# Patient Record
Sex: FEMALE | Race: WHITE | NOT HISPANIC OR LATINO | Employment: PART TIME | URBAN - METROPOLITAN AREA
[De-identification: names, ages, dates, MRNs, and addresses within clinical notes are randomized per-mention and may not be internally consistent; named-entity substitution may affect disease eponyms.]

---

## 2021-03-23 ENCOUNTER — OFFICE VISIT (OUTPATIENT)
Dept: PODIATRY | Facility: CLINIC | Age: 61
End: 2021-03-23

## 2021-03-23 VITALS
WEIGHT: 223 LBS | DIASTOLIC BLOOD PRESSURE: 66 MMHG | HEIGHT: 65 IN | SYSTOLIC BLOOD PRESSURE: 112 MMHG | BODY MASS INDEX: 37.15 KG/M2 | HEART RATE: 74 BPM

## 2021-03-23 DIAGNOSIS — M25.571 CHRONIC PAIN OF RIGHT ANKLE: Primary | ICD-10-CM

## 2021-03-23 DIAGNOSIS — M19.071 ARTHRITIS OF ANKLE, RIGHT: ICD-10-CM

## 2021-03-23 DIAGNOSIS — G89.29 CHRONIC PAIN OF RIGHT ANKLE: Primary | ICD-10-CM

## 2021-03-23 DIAGNOSIS — M25.371 ANKLE INSTABILITY, RIGHT: ICD-10-CM

## 2021-03-23 PROCEDURE — 20606 DRAIN/INJ JOINT/BURSA W/US: CPT | Performed by: PODIATRIST

## 2021-03-23 PROCEDURE — 99203 OFFICE O/P NEW LOW 30 MIN: CPT | Performed by: PODIATRIST

## 2021-03-23 RX ORDER — TRIAMTERENE AND HYDROCHLOROTHIAZIDE 75; 50 MG/1; MG/1
0.5 TABLET ORAL DAILY
COMMUNITY
Start: 2021-03-01 | End: 2022-03-01

## 2021-03-23 RX ORDER — SCOLOPAMINE TRANSDERMAL SYSTEM 1 MG/1
1.5 PATCH, EXTENDED RELEASE TRANSDERMAL
COMMUNITY
Start: 2021-03-01 | End: 2022-09-08

## 2021-03-23 RX ORDER — LEVOTHYROXINE SODIUM 0.07 MG/1
75 TABLET ORAL
COMMUNITY
Start: 2021-01-28

## 2021-03-23 RX ORDER — DICLOFENAC SODIUM 25 MG/1
TABLET, DELAYED RELEASE ORAL
COMMUNITY
Start: 2021-02-15 | End: 2021-06-10

## 2021-03-23 RX ORDER — DICLOFENAC SODIUM 75 MG/1
75 TABLET, DELAYED RELEASE ORAL
COMMUNITY
Start: 2021-03-01 | End: 2021-06-10

## 2021-03-23 RX ORDER — LIOTHYRONINE SODIUM 5 UG/1
10 TABLET ORAL DAILY
COMMUNITY
Start: 2021-01-28

## 2021-03-23 RX ORDER — MOMETASONE FUROATE 50 UG/1
2 SPRAY, METERED NASAL DAILY
COMMUNITY
Start: 2021-03-01 | End: 2021-06-10

## 2021-03-23 RX ORDER — ROSUVASTATIN CALCIUM 10 MG/1
10 TABLET, COATED ORAL DAILY
COMMUNITY

## 2021-03-23 RX ORDER — TRIAMCINOLONE ACETONIDE 40 MG/ML
40 INJECTION, SUSPENSION INTRA-ARTICULAR; INTRAMUSCULAR ONCE
Status: COMPLETED | OUTPATIENT
Start: 2021-03-23 | End: 2021-03-23

## 2021-03-23 RX ORDER — TRIAMCINOLONE ACETONIDE 55 UG/1
2 SPRAY, METERED NASAL DAILY
COMMUNITY
Start: 2021-03-01 | End: 2022-03-01

## 2021-03-23 RX ORDER — FEXOFENADINE HCL 180 MG/1
180 TABLET ORAL
COMMUNITY
End: 2021-06-10

## 2021-03-23 RX ADMIN — TRIAMCINOLONE ACETONIDE 40 MG: 40 INJECTION, SUSPENSION INTRA-ARTICULAR; INTRAMUSCULAR at 10:55

## 2021-03-24 NOTE — PATIENT INSTRUCTIONS
Ankle Pain  The ankle joint holds your body weight and allows you to move around. Ankle pain can occur on either side or the back of one ankle or both ankles. Ankle pain may be sharp and burning or dull and aching. There may be tenderness, stiffness, redness, or warmth around the ankle. Many things can cause ankle pain, including an injury to the area and overuse of the ankle.  Follow these instructions at home:  Activity  · Rest your ankle as told by your health care provider. Avoid any activities that cause ankle pain.  · Do not use the injured limb to support your body weight until your health care provider says that you can. Use crutches as told by your health care provider.  · Do exercises as told by your health care provider.  · Ask your health care provider when it is safe to drive if you have a brace on your ankle.  If you have a brace:  · Wear the brace as told by your health care provider. Remove it only as told by your health care provider.  · Loosen the brace if your toes tingle, become numb, or turn cold and blue.  · Keep the brace clean.  · If the brace is not waterproof:  ? Do not let it get wet.  ? Cover it with a watertight covering when you take a bath or shower.  If you were given an elastic bandage:    · Remove it when you take a bath or a shower.  · Try not to move your ankle very much, but wiggle your toes from time to time. This helps to prevent swelling.  · Adjust the bandage to make it more comfortable if it feels too tight.  · Loosen the bandage if you have numbness or tingling in your foot or if your foot turns cold and blue.  Managing pain, stiffness, and swelling    · If directed, put ice on the painful area.  ? If you have a removable brace or elastic bandage, remove it as told by your health care provider.  ? Put ice in a plastic bag.  ? Place a towel between your skin and the bag.  ? Leave the ice on for 20 minutes, 2-3 times a day.  · Move your toes often to avoid stiffness and to  lessen swelling.  · Raise (elevate) your ankle above the level of your heart while you are sitting or lying down.  General instructions  · Record information about your pain. Writing down the following may be helpful for you and your health care provider:  ? How often you have ankle pain.  ? Where the pain is located.  ? What the pain feels like.  · If treatment involves wearing a prescribed shoe or insole, make sure you wear it correctly and for as long as told by your health care provider.  · Take over-the-counter and prescription medicines only as told by your health care provider.  · Keep all follow-up visits as told by your health care provider. This is important.  Contact a health care provider if:  · Your pain gets worse.  · Your pain is not relieved with medicines.  · You have a fever or chills.  · You are having more trouble with walking.  · You have new symptoms.  Get help right away if:  · Your foot, leg, toes, or ankle:  ? Tingles or becomes numb.  ? Becomes swollen.  ? Turns pale or blue.  Summary  · Ankle pain can occur on either side or the back of one ankle or both ankles.  · Ankle pain may be sharp and burning or dull and aching.  · Rest your ankle as told by your health care provider. If told, apply ice to the area.  · Take over-the-counter and prescription medicines only as told by your health care provider.  This information is not intended to replace advice given to you by your health care provider. Make sure you discuss any questions you have with your health care provider.  Document Revised: 04/07/2020 Document Reviewed: 06/26/2019  Elsevier Patient Education © 2021 [employer]

## 2021-03-24 NOTE — PROGRESS NOTES
03/23/2021  Foot and Ankle Surgery - New Patient   Provider: Dr. Travis Huffman DPM  Location: [facility]    Subjective:  Tori Payan is a 60 y.o. female.     Chief Complaint   Patient presents with   • Right Ankle - Pain       HPI: Patient is a 60-year-old female that presents with pain involving the right ankle.  She states that these issues have been present for several years.  She has seen numerous providers for this issue in the past.  She states that a majority of the symptoms started after an ankle injury approximately 6 years ago.  She has had intermittent issues involving her ankle ever since.  She does recall the ankle feeling weak and giving out on her.  She has had multiple inversion injuries.  Previous foot and ankle specialist that she has seen states that she has profound arthritis and may require ankle replacement.  Patient has been managing with an AFO but continues to have significant pain and limitation with any substantial activity.  She rates the symptoms a 9 out of 10 at times.  She does state that this significant pain is intermittent and relatively infrequent.      Allergies   Allergen Reactions   • Ciprofloxacin GI Intolerance     nausea   • Codeine Nausea Only   • Sulfamethoxazole-Trimethoprim Nausea And Vomiting   • Celecoxib Rash     Burning in stomach       History reviewed. No pertinent past medical history.    History reviewed. No pertinent surgical history.    History reviewed. No pertinent family history.    Social History     Socioeconomic History   • Marital status:      Spouse name: Not on file   • Number of children: Not on file   • Years of education: Not on file   • Highest education level: Not on file        Current Outpatient Medications on File Prior to Visit   Medication Sig Dispense Refill   • Cholecalciferol 25 MCG (1000 UT) capsule Take 50 mcg by mouth Daily.     • diclofenac (VOLTAREN) 25 MG EC tablet      • diclofenac (VOLTAREN) 75 MG EC  "tablet Take 75 mg by mouth.     • fexofenadine (ALLEGRA) 180 MG tablet Take 180 mg by mouth.     • levothyroxine (SYNTHROID, LEVOTHROID) 75 MCG tablet Take 75 mcg by mouth.     • liothyronine (CYTOMEL) 5 MCG tablet Take 10 mcg by mouth Daily.     • mometasone (NASONEX) 50 MCG/ACT nasal spray 2 sprays into the nostril(s) as directed by provider Daily.     • rosuvastatin (CRESTOR) 10 MG tablet Take 10 mg by mouth Daily.     • Scopolamine (TRANSDERM-SCOP) 1.5 MG/3DAYS patch Place 1.5 mg on the skin as directed by provider Every 3 (Three) Days.     • Triamcinolone Acetonide (NASACORT) 55 MCG/ACT nasal inhaler 2 sprays into the nostril(s) as directed by provider Daily.     • triamterene-hydrochlorothiazide (MAXZIDE) 75-50 MG per tablet Take 0.5 tablets by mouth Daily.       No current facility-administered medications on file prior to visit.       Review of Systems:  General: Denies fever, chills, fatigue, and weakness.  Eyes: Denies vision loss, blurry vision, and excessive redness.  ENT: Denies hearing issues and difficulty swallowing.  Cardiovascular: Denies palpitations, chest pain, or syncopal episodes.  Respiratory: Denies shortness of breath, wheezing, and coughing.  GI: Denies abdominal pain, nausea, and vomiting.   : Denies frequency, hematuria, and urgency.  Musculoskeletal: + Chronic right ankle pain  Derm: Denies rash, open wounds, or suspicious lesions.  Neuro: Denies headaches, numbness, loss of coordination, and tremors.  Psych: Denies anxiety and depression.  Endocrine: Denies temperature intolerance and changes in appetite.  Heme: Denies bleeding disorders or abnormal bruising.     Objective   /66   Pulse 74   Ht 165.1 cm (65\")   Wt 101 kg (223 lb)   BMI 37.11 kg/m²     Foot/Ankle Exam:       General:   Appearance: appears stated age and healthy    Orientation: AAOx3    Affect: appropriate    Gait: antalgic      VASCULAR      Right Foot Vascularity   Normal vascular exam    Dorsalis pedis:  " 2+  Posterior tibial:  2+  Skin Temperature: warm    Edema Grading:  None  CFT:  < 3 seconds  Pedal Hair Growth:  Present  Varicosities: none        NEUROLOGIC     Right Foot Neurologic   Light touch sensation:  Normal  Hot/Cold sensation: normal    Achilles reflex:  2+     MUSCULOSKELETAL      Right Foot Musculoskeletal   Ecchymosis:  None  Tenderness: medial malleolus, lateral malleolus and anterior tibiotalar joint line    Arch:  Normal     MUSCLE STRENGTH     Right Foot Muscle Strength   Normal strength    Foot dorsiflexion:  5  Foot plantar flexion:  5  Foot inversion:  5  Foot eversion:  5     DERMATOLOGIC     Right Foot Dermatologic   Skin: skin intact       TESTS     Right Foot Tests   Anterior drawer: positive    Varus tilt: positive        Right Foot Additional Comments Prominent swelling involving the anterior and lateral aspects of the ankle.  Resting varus angulation.  Rigidity and crepitus noted with ankle range of motion.  Mild guarding.  No prominent discomfort or limitation to the foot joints.  Mild discomfort involving the knee      Assessment/Plan   Diagnoses and all orders for this visit:    1. Chronic pain of right ankle (Primary)  -     XR Ankle 3+ View Right  -     triamcinolone acetonide (KENALOG-40) injection 40 mg    2. Arthritis of ankle, right    3. Ankle instability, right      Patient presents with prominent issues involving the right ankle.  Symptoms are gradually progressive and she is concerned that they will cause further limitation.  She would like to remain active.  Imaging was reviewed showing prominent degenerative changes involving the ankle with the talus cutting into the medial aspect of the tibia.  Resting varus angulation is present with degenerative changes involving the lateral gutter.  I do feel that her symptoms are consistent with degeneration from chronic ankle instability.  We did review the diagnosis and treatment options at length.  She has undergone multiple  "conservative treatments including previous steroid injections and is currently wearing the AFO.  She is very unhappy with the brace that she has.  She has had some improvement with previous steroid injections.  We did discuss operative options including consideration for total ankle replacement versus ankle arthrodesis.  I did review the benefits and risks of both procedures.  She does not feel that she is ready for any type of surgical intervention.  She would like to proceed with observation and conservative care.  Given her recent exacerbation in pain, I do feel that she would benefit from a steroid injection.  We did review the risks and benefits.  Procedure was performed under ultrasound guidance without complication.  I have also dispensed a lace up ankle brace and reviewed proper use and effects.  I would like her to avoid any high-impact and excessive activity.  She is to avoid uneven terrain.  She understands and agrees and is to see me in 3 months for reevaluation.    Ankle Joint Steroid Injection: Right    Informed consent was obtained before proceeding with the procedure. The skin around the anterior lateral portal of the joint was cleansed with alcohol and initially anesthetized with approximately 1.0 mL of 1% lidocaine plain. The area was then prepped with betadine and allowed to dry.  Using aseptic technique, a solution totaling 1.5mL was injected into the ankle joint utilizing a 22g 1.5\" hypodermic needle. The solution contained 0.5cc of 0.5% Marcaine plain, 0.5cc of 1% lidocaine plain, and 0.5cc of Kenalog.  Mild compression was then applied to the injection site and bandage applied.  The patient noted immediate pain relief with active range of motion and neurovascular status remaining intact. The patient tolerated the injection well without complication.      Orders Placed This Encounter   Procedures   • XR Ankle 3+ View Right     Order Specific Question:   Reason for Exam:     Answer:   RM 13 " second opinion past injury 7 years ago     Order Specific Question:   Does this patient have a diabetic monitoring/medication delivering device on?     Answer:   No        Note is dictated utilizing voice recognition software. Unfortunately this leads to occasional typographical errors. I apologize in advance if the situation occurs. If questions occur please do not hesitate to call our office.

## 2021-06-10 ENCOUNTER — OFFICE VISIT (OUTPATIENT)
Dept: PODIATRY | Facility: CLINIC | Age: 61
End: 2021-06-10

## 2021-06-10 VITALS — BODY MASS INDEX: 37.15 KG/M2 | HEIGHT: 65 IN | WEIGHT: 223 LBS

## 2021-06-10 DIAGNOSIS — M19.071 ARTHRITIS OF ANKLE, RIGHT: ICD-10-CM

## 2021-06-10 DIAGNOSIS — M25.371 ANKLE INSTABILITY, RIGHT: ICD-10-CM

## 2021-06-10 DIAGNOSIS — G89.29 CHRONIC PAIN OF RIGHT ANKLE: Primary | ICD-10-CM

## 2021-06-10 DIAGNOSIS — M25.571 CHRONIC PAIN OF RIGHT ANKLE: Primary | ICD-10-CM

## 2021-06-10 PROCEDURE — 99213 OFFICE O/P EST LOW 20 MIN: CPT | Performed by: PODIATRIST

## 2021-06-10 PROCEDURE — 20606 DRAIN/INJ JOINT/BURSA W/US: CPT | Performed by: PODIATRIST

## 2021-06-10 RX ORDER — CELECOXIB 200 MG/1
200 CAPSULE ORAL DAILY
COMMUNITY
End: 2021-10-21 | Stop reason: SDUPTHER

## 2021-06-10 RX ORDER — TRIAMCINOLONE ACETONIDE 40 MG/ML
40 INJECTION, SUSPENSION INTRA-ARTICULAR; INTRAMUSCULAR ONCE
Status: COMPLETED | OUTPATIENT
Start: 2021-06-10 | End: 2021-06-10

## 2021-06-10 RX ADMIN — TRIAMCINOLONE ACETONIDE 40 MG: 40 INJECTION, SUSPENSION INTRA-ARTICULAR; INTRAMUSCULAR at 16:06

## 2021-06-14 NOTE — PROGRESS NOTES
"06/10/2021  Foot and Ankle Surgery - Established Patient/Follow-up  Provider: Dr. Travis Huffman DPM  Location: [facility]    Subjective:  Tori Payan is a 60 y.o. female.     Chief Complaint   Patient presents with   • Right Ankle - Follow-up       HPI: Patient returns for evaluation of her right ankle.  She continues to have chronic pain and limitation.  She did notice substantial improvement after previous steroid injection.  She states that the pain returned approximately 4 weeks ago.  She has gradually increased discomfort causing further limitation.  She has been wearing the lace up ankle brace intermittently without any substantial improvement.  She would like to discuss further treatment options today.    Allergies   Allergen Reactions   • Ciprofloxacin GI Intolerance     nausea   • Codeine Nausea Only   • Sulfamethoxazole-Trimethoprim Nausea And Vomiting   • Celecoxib Rash     Burning in stomach       Current Outpatient Medications on File Prior to Visit   Medication Sig Dispense Refill   • celecoxib (CeleBREX) 200 MG capsule Take 200 mg by mouth Daily.     • Cholecalciferol 25 MCG (1000 UT) capsule Take 50 mcg by mouth Daily.     • levothyroxine (SYNTHROID, LEVOTHROID) 75 MCG tablet Take 75 mcg by mouth.     • liothyronine (CYTOMEL) 5 MCG tablet Take 10 mcg by mouth Daily.     • rosuvastatin (CRESTOR) 10 MG tablet Take 10 mg by mouth Daily.     • Scopolamine (TRANSDERM-SCOP) 1.5 MG/3DAYS patch Place 1.5 mg on the skin as directed by provider Every 3 (Three) Days.     • Triamcinolone Acetonide (NASACORT) 55 MCG/ACT nasal inhaler 2 sprays into the nostril(s) as directed by provider Daily.     • triamterene-hydrochlorothiazide (MAXZIDE) 75-50 MG per tablet Take 0.5 tablets by mouth Daily.       No current facility-administered medications on file prior to visit.       Objective   Ht 165.1 cm (65\")   Wt 101 kg (223 lb)   BMI 37.11 kg/m²     General:   Appearance: appears stated age and " healthy    Orientation: AAOx3    Affect: appropriate    Gait: antalgic       VASCULAR       Right Foot Vascularity   Normal vascular exam    Dorsalis pedis:  2+  Posterior tibial:  2+  Skin Temperature: warm    Edema Grading:  None  CFT:  < 3 seconds  Pedal Hair Growth:  Present  Varicosities: none        NEUROLOGIC      Right Foot Neurologic   Light touch sensation:  Normal  Hot/Cold sensation: normal    Achilles reflex:  2+      MUSCULOSKELETAL       Right Foot Musculoskeletal   Ecchymosis:  None  Tenderness: medial malleolus, lateral malleolus and anterior tibiotalar joint line    Arch:  Normal      MUSCLE STRENGTH      Right Foot Muscle Strength   Normal strength    Foot dorsiflexion:  5  Foot plantar flexion:  5  Foot inversion:  5  Foot eversion:  5      DERMATOLOGIC      Right Foot Dermatologic   Skin: skin intact        TESTS      Right Foot Tests   Anterior drawer: positive    Varus tilt: positive        Right Foot Additional Comments: Continued pain with palpation involving the perimalleoli region.  Prominent swelling involving the anterior lateral aspect of the ankle.    Assessment/Plan   Diagnoses and all orders for this visit:    1. Chronic pain of right ankle (Primary)    2. Arthritis of ankle, right  -     triamcinolone acetonide (KENALOG-40) injection 40 mg    3. Ankle instability, right      Patient returns with continued pain and limitation involving the right ankle.  She has active swelling involving the anterior lateral aspect of the joint.  Previous imaging was reviewed and discussed in office.  Treatment options remain limited.  She did respond to previous steroid injection.  We did discuss possible repeat injection.  We also discussed and reviewed surgical options including total ankle replacement and ankle arthrodesis.  We did discuss recoveries and risks at length.  Patient does not want to proceed with operative intervention at this time.  She would like to proceed with a steroid injection.  " We reviewed the risks and benefits.  Procedure was performed under ultrasound guidance without complication.  I have asked that she continue to wear the lace up ankle brace with activity.  We did review rice therapy as well as proper use of OTC/prescription anti-inflammatories.  I would like to see her in 3 months for further discussion and possible CT.    Ankle Joint Steroid Injection: Right     Informed consent was obtained before proceeding with the procedure. The skin around the anterior lateral portal of the joint was cleansed with alcohol and initially anesthetized with approximately 1.0 mL of 1% lidocaine plain. The area was then prepped with betadine and allowed to dry.  Using aseptic technique, a solution totaling 1.5mL was injected into the ankle joint utilizing a 22g 1.5\" hypodermic needle under ultrasound guidance. The needle and joint were visualized during the procedure. The solution contained 0.5cc of 0.5% Marcaine plain, 0.5cc of 1% lidocaine plain, and 0.5cc of Kenalog.  Mild compression was then applied to the injection site and bandage applied.  The patient noted immediate pain relief with active range of motion and neurovascular status remaining intact. The patient tolerated the injection well without complication.    No orders of the defined types were placed in this encounter.         Note is dictated utilizing voice recognition software. Unfortunately this leads to occasional typographical errors. I apologize in advance if the situation occurs. If questions occur please do not hesitate to call our office.  "

## 2021-10-21 ENCOUNTER — OFFICE VISIT (OUTPATIENT)
Dept: PODIATRY | Facility: CLINIC | Age: 61
End: 2021-10-21

## 2021-10-21 VITALS
SYSTOLIC BLOOD PRESSURE: 116 MMHG | BODY MASS INDEX: 37.15 KG/M2 | HEIGHT: 65 IN | WEIGHT: 223 LBS | DIASTOLIC BLOOD PRESSURE: 75 MMHG | HEART RATE: 67 BPM

## 2021-10-21 DIAGNOSIS — M25.571 CHRONIC PAIN OF RIGHT ANKLE: Primary | ICD-10-CM

## 2021-10-21 DIAGNOSIS — M19.071 ARTHRITIS OF ANKLE, RIGHT: ICD-10-CM

## 2021-10-21 DIAGNOSIS — G89.29 CHRONIC PAIN OF RIGHT ANKLE: Primary | ICD-10-CM

## 2021-10-21 DIAGNOSIS — M25.371 ANKLE INSTABILITY, RIGHT: ICD-10-CM

## 2021-10-21 PROCEDURE — 99213 OFFICE O/P EST LOW 20 MIN: CPT | Performed by: PODIATRIST

## 2021-10-21 PROCEDURE — 20605 DRAIN/INJ JOINT/BURSA W/O US: CPT | Performed by: PODIATRIST

## 2021-10-21 PROCEDURE — 76942 ECHO GUIDE FOR BIOPSY: CPT | Performed by: PODIATRIST

## 2021-10-21 RX ORDER — MOMETASONE FUROATE 50 UG/1
SPRAY, METERED NASAL
COMMUNITY
Start: 2021-09-21 | End: 2021-10-21 | Stop reason: SDUPTHER

## 2021-10-21 RX ORDER — DICLOFENAC POTASSIUM 50 MG/1
TABLET, FILM COATED ORAL
COMMUNITY
Start: 2021-09-21

## 2021-10-21 RX ORDER — TRIAMCINOLONE ACETONIDE 40 MG/ML
20 INJECTION, SUSPENSION INTRA-ARTICULAR; INTRAMUSCULAR ONCE
Status: COMPLETED | OUTPATIENT
Start: 2021-10-21 | End: 2021-10-21

## 2021-10-21 RX ORDER — FEXOFENADINE HCL 180 MG/1
180 TABLET ORAL
COMMUNITY

## 2021-10-21 RX ORDER — IBUPROFEN 200 MG
400 TABLET ORAL EVERY 8 HOURS PRN
COMMUNITY

## 2021-10-21 RX ADMIN — TRIAMCINOLONE ACETONIDE 20 MG: 40 INJECTION, SUSPENSION INTRA-ARTICULAR; INTRAMUSCULAR at 15:45

## 2021-10-21 NOTE — PROGRESS NOTES
10/21/2021  Foot and Ankle Surgery - Established Patient/Follow-up  Provider: Dr. Travis Huffman DPM  Location: [facility]    Subjective:  Tori Payan is a 60 y.o. female.     Chief Complaint   Patient presents with   • Right Ankle - Pain, Edema, Follow-up   • Follow-up     last pcp 9/21/2021       HPI: Patient returns for follow-up regarding her right ankle pain.  She states that she has done well since last exam.  She did respond favorably to the previous steroid injection and feels that she continues to do better than normal.  She does have bad days and continues to have swelling and discomfort with increased activity.  No other issues or concerns.    Allergies   Allergen Reactions   • Ciprofloxacin GI Intolerance     nausea   • Codeine Nausea Only   • Sulfamethoxazole-Trimethoprim Nausea And Vomiting   • Celecoxib Rash     Burning in stomach       Current Outpatient Medications on File Prior to Visit   Medication Sig Dispense Refill   • Cholecalciferol 25 MCG (1000 UT) capsule Take 50 mcg by mouth Daily.     • levothyroxine (SYNTHROID, LEVOTHROID) 75 MCG tablet Take 75 mcg by mouth.     • liothyronine (CYTOMEL) 5 MCG tablet Take 10 mcg by mouth Daily.     • rosuvastatin (CRESTOR) 10 MG tablet Take 10 mg by mouth Daily.     • Scopolamine (TRANSDERM-SCOP) 1.5 MG/3DAYS patch Place 1.5 mg on the skin as directed by provider Every 3 (Three) Days.     • Triamcinolone Acetonide (NASACORT) 55 MCG/ACT nasal inhaler 2 sprays into the nostril(s) as directed by provider Daily.     • triamterene-hydrochlorothiazide (MAXZIDE) 75-50 MG per tablet Take 0.5 tablets by mouth Daily.     • vitamin D3 125 MCG (5000 UT) capsule capsule Take 5,000 Units by mouth Daily.     • diclofenac (CATAFLAM) 50 MG tablet      • fexofenadine (ALLEGRA) 180 MG tablet Take 180 mg by mouth.     • ibuprofen (ADVIL,MOTRIN) 200 MG tablet Take 400 mg by mouth Every 8 (Eight) Hours As Needed.     • [DISCONTINUED] celecoxib (CeleBREX) 200  "MG capsule Take 200 mg by mouth Daily.     • [DISCONTINUED] mometasone (NASONEX) 50 MCG/ACT nasal spray        No current facility-administered medications on file prior to visit.       Objective   /75   Pulse 67   Ht 165.1 cm (65\")   Wt 101 kg (223 lb)   BMI 37.11 kg/m²     General:   Appearance: appears stated age and healthy    Orientation: AAOx3    Affect: appropriate    Gait: antalgic       VASCULAR       Right Foot Vascularity   Normal vascular exam    Dorsalis pedis:  2+  Posterior tibial:  2+  Skin Temperature: warm    Edema Grading:  None  CFT:  < 3 seconds  Pedal Hair Growth:  Present  Varicosities: none        NEUROLOGIC      Right Foot Neurologic   Light touch sensation:  Normal  Hot/Cold sensation: normal    Achilles reflex:  2+      MUSCULOSKELETAL       Right Foot Musculoskeletal   Ecchymosis:  None  Tenderness: medial malleolus, lateral malleolus and anterior tibiotalar joint line    Arch:  Normal      MUSCLE STRENGTH      Right Foot Muscle Strength   Normal strength    Foot dorsiflexion:  5  Foot plantar flexion:  5  Foot inversion:  5  Foot eversion:  5      DERMATOLOGIC      Right Foot Dermatologic   Skin: skin intact        TESTS      Right Foot Tests   Anterior drawer: positive    Varus tilt: positive      Assessment/Plan   Diagnoses and all orders for this visit:    1. Chronic pain of right ankle (Primary)  -     XR Ankle 3+ View Right    2. Arthritis of ankle, right    3. Ankle instability, right      Patient returns for issues involving her right ankle.  She has responded well to previous steroid injection.  She is asking for repeat injection today.  We did review previous imaging and further treatment options.  She does not feel that she is ready for any operative intervention.  She has been intermittently bracing with a lace up ankle brace and states that she has remained relatively active.  I do feel that she is doing well overall.  I do agree that we can proceed with a repeat " "steroid injection which was performed under ultrasound guidance without complication.  I have asked that she monitor and call with any new issues.  Patient is to see me in 3+ months for routine follow-up and imaging of the right ankle.  Greater than 20 minutes was spent before, during, and after evaluation for patient care    Ankle Joint Steroid Injection: Right     Consent and time out was performed before proceeding with the procedure. The skin around the anterior lateral portal of the joint was cleansed with alcohol and initially anesthetized with approximately 1.0 mL of 1% lidocaine plain. The area was then prepped with betadine and allowed to dry.  Using aseptic technique, a solution totaling 1.5mL was injected into the ankle joint utilizing a 22g 1.5\" hypodermic needle. The solution contained 0.5cc of 0.5% Marcaine plain, 0.5cc of 1% lidocaine plain, and 0.5cc of Kenalog.  Mild compression was then applied to the injection site and bandage applied.  The patient noted immediate pain relief with active range of motion and neurovascular status remaining intact. The patient tolerated the injection well without complication.      Orders Placed This Encounter   Procedures   • XR Ankle 3+ View Right     Chronic ankle pain     Scheduling Instructions:      Room 13      wb     Order Specific Question:   Reason for Exam:     Answer:   right ankle pain     Order Specific Question:   Does this patient have a diabetic monitoring/medication delivering device on?     Answer:   No     Order Specific Question:   Release to patient     Answer:   Immediate          Note is dictated utilizing voice recognition software. Unfortunately this leads to occasional typographical errors. I apologize in advance if the situation occurs. If questions occur please do not hesitate to call our office.  "

## 2022-03-10 ENCOUNTER — TELEPHONE (OUTPATIENT)
Dept: PODIATRY | Facility: CLINIC | Age: 62
End: 2022-03-10

## 2022-03-10 ENCOUNTER — OFFICE VISIT (OUTPATIENT)
Dept: PODIATRY | Facility: CLINIC | Age: 62
End: 2022-03-10

## 2022-03-10 VITALS
SYSTOLIC BLOOD PRESSURE: 141 MMHG | HEIGHT: 65 IN | WEIGHT: 223 LBS | DIASTOLIC BLOOD PRESSURE: 82 MMHG | BODY MASS INDEX: 37.15 KG/M2 | HEART RATE: 60 BPM

## 2022-03-10 DIAGNOSIS — M25.371 ANKLE INSTABILITY, RIGHT: ICD-10-CM

## 2022-03-10 DIAGNOSIS — G89.29 CHRONIC PAIN OF RIGHT ANKLE: Primary | ICD-10-CM

## 2022-03-10 DIAGNOSIS — M19.071 ARTHRITIS OF ANKLE, RIGHT: ICD-10-CM

## 2022-03-10 DIAGNOSIS — M25.571 CHRONIC PAIN OF RIGHT ANKLE: Primary | ICD-10-CM

## 2022-03-10 PROCEDURE — 20605 DRAIN/INJ JOINT/BURSA W/O US: CPT | Performed by: PODIATRIST

## 2022-03-10 PROCEDURE — 99213 OFFICE O/P EST LOW 20 MIN: CPT | Performed by: PODIATRIST

## 2022-03-10 RX ORDER — GABAPENTIN 100 MG/1
100 CAPSULE ORAL 3 TIMES DAILY
Status: DISCONTINUED | OUTPATIENT
Start: 2022-03-10 | End: 2022-03-10

## 2022-03-10 RX ORDER — TRIAMCINOLONE ACETONIDE 40 MG/ML
20 INJECTION, SUSPENSION INTRA-ARTICULAR; INTRAMUSCULAR ONCE
Status: COMPLETED | OUTPATIENT
Start: 2022-03-10 | End: 2022-03-10

## 2022-03-10 RX ORDER — GABAPENTIN 100 MG/1
100 CAPSULE ORAL 3 TIMES DAILY
Qty: 90 CAPSULE | Refills: 1 | Status: SHIPPED | OUTPATIENT
Start: 2022-03-10

## 2022-03-10 RX ADMIN — TRIAMCINOLONE ACETONIDE 20 MG: 40 INJECTION, SUSPENSION INTRA-ARTICULAR; INTRAMUSCULAR at 16:24

## 2022-03-10 NOTE — TELEPHONE ENCOUNTER
Hub staff attempted to follow warm transfer process and was unsuccessful     Caller: VIDA RAMOS    Relationship to patient: SELF    Best call back number: [phone]    Patient is needing: TRYING TO  GABAPENTIN THAT WAS SENT IN, PHARMACY THAT IT WAS SENT TO ([facility]) HAS NO RECORDS OF IT. PLEASE CALL HER BACK AT THE NUMBER ABOVE.

## 2022-03-11 NOTE — PROGRESS NOTES
03/10/2022  Foot and Ankle Surgery - Established Patient/Follow-up  Provider: Dr. Travis Huffman DPM  Location: [facility]    Subjective:  Tori Payan is a 61 y.o. female.     Chief Complaint   Patient presents with   • Right Ankle - Follow-up, Pain     LAST PCP APPT WITH MENDOZA REN MD 2/1/2022       HPI: Patient returns for recurrent pain involving her right ankle.  She states that she was doing relatively well until approximately the last 3 or 4 weeks.  Symptoms have been gradually progressive causing limitation with daily activity.  She notices fairly significant swelling involving the ankle making it difficult to wear certain shoes.  She also complains of right sided sciatica which has been progressing.  She has taken gabapentin in the past which has helped both of these issues previously.    Allergies   Allergen Reactions   • Ciprofloxacin GI Intolerance     nausea   • Codeine Nausea Only   • Sulfamethoxazole-Trimethoprim Nausea And Vomiting   • Celecoxib Rash     Burning in stomach       Current Outpatient Medications on File Prior to Visit   Medication Sig Dispense Refill   • Cholecalciferol 25 MCG (1000 UT) capsule Take 50 mcg by mouth Daily.     • diclofenac (CATAFLAM) 50 MG tablet      • fexofenadine (ALLEGRA) 180 MG tablet Take 180 mg by mouth.     • ibuprofen (ADVIL,MOTRIN) 200 MG tablet Take 400 mg by mouth Every 8 (Eight) Hours As Needed.     • levothyroxine (SYNTHROID, LEVOTHROID) 75 MCG tablet Take 75 mcg by mouth.     • liothyronine (CYTOMEL) 5 MCG tablet Take 10 mcg by mouth Daily.     • rosuvastatin (CRESTOR) 10 MG tablet Take 10 mg by mouth Daily.     • Scopolamine (TRANSDERM-SCOP) 1.5 MG/3DAYS patch Place 1.5 mg on the skin as directed by provider Every 3 (Three) Days.     • vitamin D3 125 MCG (5000 UT) capsule capsule Take 5,000 Units by mouth Daily.     • triamterene-hydrochlorothiazide (MAXZIDE) 75-50 MG per tablet Take 0.5 tablets by mouth Daily.       No current  "facility-administered medications on file prior to visit.       Objective   /82   Pulse 60   Ht 165.1 cm (65\")   Wt 101 kg (223 lb)   BMI 37.11 kg/m²     General:   Appearance: appears stated age and healthy    Orientation: AAOx3    Affect: appropriate    Gait: antalgic       VASCULAR       Right Foot Vascularity   Normal vascular exam    Dorsalis pedis:  2+  Posterior tibial:  2+  Skin Temperature: warm    Edema Grading:  None  CFT:  < 3 seconds  Pedal Hair Growth:  Present  Varicosities: none        NEUROLOGIC      Right Foot Neurologic   Light touch sensation:  Normal  Hot/Cold sensation: normal    Achilles reflex:  2+      MUSCULOSKELETAL       Right Foot Musculoskeletal   Ecchymosis:  None  Tenderness: medial malleolus, lateral malleolus and anterior tibiotalar joint line    Arch:  Normal      MUSCLE STRENGTH      Right Foot Muscle Strength   Normal strength    Foot dorsiflexion:  5  Foot plantar flexion:  5  Foot inversion:  5  Foot eversion:  5      DERMATOLOGIC      Right Foot Dermatologic   Skin: skin intact        TESTS      Right Foot Tests   Anterior drawer: positive    Varus tilt: positive     Assessment/Plan   Diagnoses and all orders for this visit:    1. Chronic pain of right ankle (Primary)  -     XR Ankle 3+ View Right  -     Discontinue: gabapentin (NEURONTIN) capsule 100 mg  -     triamcinolone acetonide (KENALOG-40) injection 20 mg    2. Arthritis of ankle, right    3. Ankle instability, right    Other orders  -     gabapentin (Neurontin) 100 MG capsule; Take 1 capsule by mouth 3 (Three) Times a Day.  Dispense: 90 capsule; Refill: 1      Physical exam is relatively unchanged and stable.  She has continued pain and swelling involving the ankle with degenerative changes.  Imaging was obtained and independently reviewed showing no significant changes as compared to October 2021 imaging.  I explained that her symptoms are acute on chronic exacerbation.  I do feel that her ankle could be " "exacerbated to how she is walking secondary to her lower back and sciatica or vice versa.  Patient states that she does have upcoming work-up for her low back.  Given her symptoms and response to gabapentin in the past, I have prescribed medication for her to take 100 mg 3 times daily.  I would like her to discuss this medication with her primary care physician for continued management.  As for her ankle pain, given that she has responded well to the previous steroid injections, I do feel that this is the most appropriate option.  We did discussed repeat injection along with risks and benefits.  Procedure was performed without complication.  I have asked that she return in 6 months for reevaluation.  She is to call with any additional issues or concerns.  Greater than 20 minutes was spent before, during, and after evaluation for patient care.    Ankle Joint Steroid Injection: Right      Consent and time out was performed before proceeding with the procedure. The skin around the anterior lateral portal of the joint was cleansed with alcohol and initially anesthetized with approximately 1.0 mL of 1% lidocaine plain. The area was then prepped with betadine and allowed to dry.  Using aseptic technique, a solution totaling 1.5mL was injected into the ankle joint utilizing a 22g 1.5\" hypodermic needle. The solution contained 0.5cc of 0.5% Marcaine plain, 0.5cc of 1% lidocaine plain, and 0.5cc of Kenalog.  Mild compression was then applied to the injection site and bandage applied.  The patient noted immediate pain relief with active range of motion and neurovascular status remaining intact. The patient tolerated the injection well without complication.    Orders Placed This Encounter   Procedures   • XR Ankle 3+ View Right     Order Specific Question:   Reason for Exam:     Answer:   RIGHT ANKLE INSTABILITY. ROOM 13 WB     Order Specific Question:   Does this patient have a diabetic monitoring/medication delivering device " on?     Answer:   No     Order Specific Question:   Release to patient     Answer:   Immediate          Note is dictated utilizing voice recognition software. Unfortunately this leads to occasional typographical errors. I apologize in advance if the situation occurs. If questions occur please do not hesitate to call our office.

## 2022-06-03 ENCOUNTER — TELEPHONE (OUTPATIENT)
Dept: PODIATRY | Facility: CLINIC | Age: 62
End: 2022-06-03

## 2022-06-03 NOTE — TELEPHONE ENCOUNTER
Provider: DR SUAZO     Caller: VIDA RAMOS     Relationship to Patient: SELF     Phone Number: [phone]     Reason for Call: PATIENT CALLED AND WOULD LIKE TO BE SCHEDULED FOR AN INJECTION PLEASE ADVISE

## 2022-07-21 ENCOUNTER — TELEPHONE (OUTPATIENT)
Dept: PODIATRY | Facility: CLINIC | Age: 62
End: 2022-07-21

## 2022-07-21 NOTE — TELEPHONE ENCOUNTER
Caller: Tori Payan    Relationship to patient: Self    Best call back number: [phone]    Chief complaint: BILATERAL ANKLE PAIN    Type of visit: FOLLOW UP / INJECTION    Requested date: 8.11.2022 OR 8.12.2022 IF POSSIBLE FROM 10:30-2:30 (AFTER 10:30)    If rescheduling, when is the original appointment: 6.16.2022    Additional notes: PT DOES NOT WISH TO SEE THE APRN.

## 2022-07-27 ENCOUNTER — TELEPHONE (OUTPATIENT)
Dept: PODIATRY | Facility: CLINIC | Age: 62
End: 2022-07-27

## 2022-07-27 NOTE — TELEPHONE ENCOUNTER
Hub staff attempted to follow warm transfer process and was unsuccessful     Caller: VIDA RAMOS    Relationship to patient: SELF    Best call back number: [phone]    Patient is needing: WANTS TO CHANGE 8/11 INJECTION AND FOLLOW UP APPT TO 8/9 IF POSSIBLE.

## 2022-09-08 ENCOUNTER — OFFICE VISIT (OUTPATIENT)
Dept: PODIATRY | Facility: CLINIC | Age: 62
End: 2022-09-08

## 2022-09-08 ENCOUNTER — TELEPHONE (OUTPATIENT)
Dept: PODIATRY | Facility: CLINIC | Age: 62
End: 2022-09-08

## 2022-09-08 VITALS — WEIGHT: 223 LBS | BODY MASS INDEX: 37.15 KG/M2 | HEIGHT: 65 IN

## 2022-09-08 DIAGNOSIS — G89.29 CHRONIC PAIN OF RIGHT ANKLE: Primary | ICD-10-CM

## 2022-09-08 DIAGNOSIS — M19.071 ARTHRITIS OF ANKLE, RIGHT: ICD-10-CM

## 2022-09-08 DIAGNOSIS — M25.371 ANKLE INSTABILITY, RIGHT: ICD-10-CM

## 2022-09-08 DIAGNOSIS — M25.571 CHRONIC PAIN OF RIGHT ANKLE: Primary | ICD-10-CM

## 2022-09-08 PROCEDURE — 20605 DRAIN/INJ JOINT/BURSA W/O US: CPT | Performed by: PODIATRIST

## 2022-09-08 RX ORDER — MONTELUKAST SODIUM 10 MG/1
TABLET ORAL
COMMUNITY
Start: 2022-08-29

## 2022-09-08 RX ORDER — TRIAMCINOLONE ACETONIDE 40 MG/ML
20 INJECTION, SUSPENSION INTRA-ARTICULAR; INTRAMUSCULAR ONCE
Status: COMPLETED | OUTPATIENT
Start: 2022-09-08 | End: 2022-09-08

## 2022-09-08 RX ADMIN — TRIAMCINOLONE ACETONIDE 20 MG: 40 INJECTION, SUSPENSION INTRA-ARTICULAR; INTRAMUSCULAR at 13:47

## 2022-09-08 NOTE — TELEPHONE ENCOUNTER
"    Caller: Tori Payan    Relationship to patient: Self    Best call back number:   Patient is needing:PATIENT WAS IN TODAY AND FORGOT TO TAKE A PICTURE OF THE SIGN ON THE FRONT DOOR THAT SAYS \"SPREAD KINDNESS LIKE CONFETTI\" AND IS WANTING TO SEE IF SOMEONE WOULD PLEASE TAKE A PICTURE AND SEND IT TO HER PHONE. SHE IS WANTING IT FOR HER DAUGHTERS CLASS. THANKS IN ADVANCE!  "

## 2022-09-08 NOTE — PROGRESS NOTES
"09/08/2022  Foot and Ankle Surgery - Established Patient/Follow-up  Provider: Dr. Travis Huffman DPM  Location: [facility]    Subjective:  Tori Payan is a 61 y.o. female.     Chief Complaint   Patient presents with   • Right Ankle - Pain   • Follow-up     Provider unknown/date       HPI: The patient presents to the office today for a right ankle steroid injection.    Patient states that she has been doing relatively well and continues to have intermittent discomfort involving the ankle depending on her activity level.  Symptoms are relatively stable but she feels that she would benefit from a steroid injection.  No other issues or concerns.      Allergies   Allergen Reactions   • Ciprofloxacin GI Intolerance     nausea   • Codeine Nausea Only   • Kenalog [Triamcinolone] Hives   • Sulfamethoxazole-Trimethoprim Nausea And Vomiting   • Celecoxib Rash     Burning in stomach       Current Outpatient Medications on File Prior to Visit   Medication Sig Dispense Refill   • diclofenac (CATAFLAM) 50 MG tablet      • fexofenadine (ALLEGRA) 180 MG tablet Take 180 mg by mouth.     • gabapentin (Neurontin) 100 MG capsule Take 1 capsule by mouth 3 (Three) Times a Day. 90 capsule 1   • ibuprofen (ADVIL,MOTRIN) 200 MG tablet Take 400 mg by mouth Every 8 (Eight) Hours As Needed.     • levothyroxine (SYNTHROID, LEVOTHROID) 75 MCG tablet Take 75 mcg by mouth.     • liothyronine (CYTOMEL) 5 MCG tablet Take 10 mcg by mouth Daily.     • montelukast (SINGULAIR) 10 MG tablet      • rosuvastatin (CRESTOR) 10 MG tablet Take 10 mg by mouth Daily.     • vitamin D3 125 MCG (5000 UT) capsule capsule Take 5,000 Units by mouth Daily.     • triamterene-hydrochlorothiazide (MAXZIDE) 75-50 MG per tablet Take 0.5 tablets by mouth Daily.       No current facility-administered medications on file prior to visit.       Objective   Ht 165.1 cm (65\")   Wt 101 kg (223 lb)   BMI 37.11 kg/m²     General:   Appearance: appears stated age " and healthy    Orientation: AAOx3    Affect: appropriate    Gait: antalgic       VASCULAR       Right Foot Vascularity   Normal vascular exam    Dorsalis pedis:  2+  Posterior tibial:  2+  Skin Temperature: warm    Edema Grading:  None  CFT:  < 3 seconds  Pedal Hair Growth:  Present  Varicosities: none        NEUROLOGIC      Right Foot Neurologic   Light touch sensation:  Normal  Hot/Cold sensation: normal    Achilles reflex:  2+      MUSCULOSKELETAL       Right Foot Musculoskeletal   Ecchymosis:  None  Tenderness: medial malleolus, lateral malleolus and anterior tibiotalar joint line    Arch:  Normal      MUSCLE STRENGTH      Right Foot Muscle Strength   Normal strength    Foot dorsiflexion:  5  Foot plantar flexion:  5  Foot inversion:  5  Foot eversion:  5      DERMATOLOGIC      Right Foot Dermatologic   Skin: skin intact        TESTS      Right Foot Tests   Anterior drawer: positive    Varus tilt: positive     Assessment & Plan   Diagnoses and all orders for this visit:    1. Chronic pain of right ankle (Primary)  -     Joint Aspiration/Injection  -     triamcinolone acetonide (KENALOG-40) injection 20 mg    2. Arthritis of ankle, right    3. Ankle instability, right      Patient returns for evaluation of her right ankle.  She continues to have pain and limitation.  She has responded nicely to previous steroid injections and would like to consider the same.  I do feel this is appropriate given her response to treatment.  We reviewed the risks and benefits.  Procedure was performed without complication.  I have asked that she monitor her symptoms and call with any additional issues or concerns.  I will see her in 6 months for reevaluation.    Ankle Joint Steroid Injection: Right    Consent and time out was performed before proceeding with the procedure. The skin around the anterior lateral portal of the joint was cleansed with alcohol and initially anesthetized with approximately 1.0 mL of 1% lidocaine plain. The  "area was then prepped with betadine and allowed to dry.  Using aseptic technique, a solution totaling 1.5mL was injected into the ankle joint utilizing a 22g 1.5\" hypodermic needle. The solution contained 0.5cc of 0.5% Marcaine plain, 0.5cc of 1% lidocaine plain, and 0.5cc of Kenalog.  Mild compression was then applied to the injection site and bandage applied.  The patient noted immediate pain relief with active range of motion and neurovascular status remaining intact. The patient tolerated the injection well without complication.      Orders Placed This Encounter   Procedures   • Joint Aspiration/Injection     Scheduling Instructions:      w/ ultrasound     Order Specific Question:   Release to patient     Answer:   Routine Release          Note is dictated utilizing voice recognition software. Unfortunately this leads to occasional typographical errors. I apologize in advance if the situation occurs. If questions occur please do not hesitate to call our office.    Transcribed from ambient dictation for RAMANDEEP Huffman DPM by Tabitha Ibrahim.  09/08/22   14:13 EDT    Patient verbalized consent to the visit recording.    "

## 2023-02-13 ENCOUNTER — TELEPHONE (OUTPATIENT)
Dept: PODIATRY | Facility: CLINIC | Age: 63
End: 2023-02-13

## 2023-02-13 NOTE — TELEPHONE ENCOUNTER
Caller: PATIENT    Relationship to patient: SELF     Best call back number: [phone]    Chief complaint: RIGHT ANKLE PAIN     Type of visit: INJECTION     Requested date: NEXT AVAILABLE APPT     Additional notes: PATIENT WAS CALLING TO SCHEDULE AN INJECTION WITH DR. SUAZO FOR THE RIGHT ANKLE ASAP. THANK YOU!

## 2023-02-28 ENCOUNTER — OFFICE VISIT (OUTPATIENT)
Dept: PODIATRY | Facility: CLINIC | Age: 63
End: 2023-02-28
Payer: COMMERCIAL

## 2023-02-28 VITALS — WEIGHT: 223 LBS | OXYGEN SATURATION: 96 % | HEIGHT: 65 IN | HEART RATE: 69 BPM | BODY MASS INDEX: 37.15 KG/M2

## 2023-02-28 DIAGNOSIS — M19.071 ARTHRITIS OF ANKLE, RIGHT: ICD-10-CM

## 2023-02-28 DIAGNOSIS — M25.371 ANKLE INSTABILITY, RIGHT: ICD-10-CM

## 2023-02-28 DIAGNOSIS — G89.29 CHRONIC PAIN OF RIGHT ANKLE: Primary | ICD-10-CM

## 2023-02-28 DIAGNOSIS — M25.571 CHRONIC PAIN OF RIGHT ANKLE: Primary | ICD-10-CM

## 2023-02-28 PROCEDURE — 20605 DRAIN/INJ JOINT/BURSA W/O US: CPT | Performed by: PODIATRIST

## 2023-02-28 PROCEDURE — 99213 OFFICE O/P EST LOW 20 MIN: CPT | Performed by: PODIATRIST

## 2023-02-28 RX ORDER — TRIAMCINOLONE ACETONIDE 40 MG/ML
20 INJECTION, SUSPENSION INTRA-ARTICULAR; INTRAMUSCULAR ONCE
Status: COMPLETED | OUTPATIENT
Start: 2023-02-28 | End: 2023-02-28

## 2023-02-28 RX ADMIN — TRIAMCINOLONE ACETONIDE 20 MG: 40 INJECTION, SUSPENSION INTRA-ARTICULAR; INTRAMUSCULAR at 12:45

## 2023-03-01 NOTE — PROGRESS NOTES
02/28/2023  Foot and Ankle Surgery - Established Patient/Follow-up  Provider: Dr. Travis Huffman DPM  Location: [facility]    Subjective:  Tori Payan is a 62 y.o. female.     Chief Complaint   Patient presents with   • Right Ankle - Follow-up, Pain   • Follow-up     RAMANDEEP Silverio MD last seen 01/2023 no known date  PCP not in EPIC       HPI: Tori Payan is a 62-year-old female who presents to the office today for a follow-up regarding her right ankle.    The patient was last seen approximately 6 months ago, and she received a steroid injection at that time. She reports improvement with the steroid injection. She notes her pain is exacerbated with activity. Her pain is worse at the end of the day. It usually improves in the morning. She reports popping in her right ankle when standing at her kitchen island with her right lower extremity on top of the island. She notes increased swelling.    Allergies   Allergen Reactions   • Ciprofloxacin GI Intolerance     nausea   • Codeine Nausea Only   • Kenalog [Triamcinolone] Hives   • Sulfamethoxazole-Trimethoprim Nausea And Vomiting   • Celecoxib Rash     Burning in stomach       Current Outpatient Medications on File Prior to Visit   Medication Sig Dispense Refill   • diclofenac (CATAFLAM) 50 MG tablet      • fexofenadine (ALLEGRA) 180 MG tablet Take 1 tablet by mouth.     • gabapentin (Neurontin) 100 MG capsule Take 1 capsule by mouth 3 (Three) Times a Day. 90 capsule 1   • ibuprofen (ADVIL,MOTRIN) 200 MG tablet Take 2 tablets by mouth Every 8 (Eight) Hours As Needed.     • levothyroxine (SYNTHROID, LEVOTHROID) 75 MCG tablet Take 1 tablet by mouth.     • liothyronine (CYTOMEL) 5 MCG tablet Take 2 tablets by mouth Daily.     • montelukast (SINGULAIR) 10 MG tablet      • rosuvastatin (CRESTOR) 10 MG tablet Take 1 tablet by mouth Daily.     • vitamin D3 125 MCG (5000 UT) capsule capsule Take 1 capsule by mouth Daily.     • triamterene-hydrochlorothiazide  "(MAXZIDE) 75-50 MG per tablet Take 0.5 tablets by mouth Daily.       No current facility-administered medications on file prior to visit.       Objective   Pulse 69   Ht 165.1 cm (65\")   Wt 101 kg (223 lb)   SpO2 96%   BMI 37.11 kg/m²     Foot/Ankle Exam    Right Foot Neurologic   Light touch sensation:  Normal  Hot/Cold sensation: normal    Achilles reflex:  2+      MUSCULOSKELETAL       Right Foot Musculoskeletal   Ecchymosis:  None  Tenderness: medial malleolus, lateral malleolus and anterior tibiotalar joint line    Arch:  Normal      MUSCLE STRENGTH      Right Foot Muscle Strength   Normal strength    Foot dorsiflexion:  5  Foot plantar flexion:  5  Foot inversion:  5  Foot eversion:  5      DERMATOLOGIC      Right Foot Dermatologic   Skin: skin intact        TESTS      Right Foot Tests   Anterior drawer: positive    Varus tilt: positive      02/28/2023  Continued discomfort involving the anterolateral aspect of the ankle with moderate swelling.  Ankle instability and varus angulation noted to the ankle.        Assessment/Plan   Assessment & Plan   Diagnoses and all orders for this visit:    1. Chronic pain of right ankle (Primary)    2. Ankle instability, right    3. Arthritis of ankle, right  -     XR Ankle 3+ View Right  -     Joint Aspiration/Injection  -     triamcinolone acetonide (KENALOG-40) injection 20 mg      Patient returns for follow-up regarding her right ankle.  She states that she has responded well to previous steroid injection and has continued to perform baseline activities.  She does have moderate discomfort involving the anterior lateral aspect of the ankle with increased swelling recently.  She does feel that she would benefit from repeat steroid injection.  We did review further treatment options including surgery.  She states that she is not willing to proceed with surgery at this time as she also has issues involving her back and knees.  She does feel that back surgery will likely " "be first and tentatively scheduled for next year.  I did review surgical options are involving her ankle and do feel that due to her ankle instability that an ankle fusion would be the most appropriate choice.  We did discuss this at length.  Patient states that she understands.  Injection to the ankle was performed without complication in the standard fashion.  I have asked that she continue to use the brace with increased activities and monitor closely.  She is to call with any additional issues or concerns and I will see her in 6 months for reevaluation and further planning.  Greater than 20 minutes was spent before, during, and after evaluation for patient care    Ankle Joint Steroid Injection: Right ankle    Consent and time out was performed before proceeding with the procedure. The skin around the anterior lateral portal of the joint was cleansed with alcohol and initially anesthetized with approximately 1.0 mL of 1% lidocaine plain. The area was then prepped with betadine and allowed to dry. Using aseptic technique, a solution totaling 1.5 mL was injected about the ankle joint utilizing a 22g 1.5 \" hypodermic needle. The solution contained 0.5 mL of 1% lidocaine plain, 0.5 mL of 0.5% Marcaine plain, and 0.5 mL of Kenalog. After the injection, the patient noted immediate pain relief. Mild compression was placed at the injection site followed by a sterile bandage. The patient tolerated the injection well without complication.      Orders Placed This Encounter   Procedures   • Joint Aspiration/Injection     Scheduling Instructions:      w/ ultrasound     Order Specific Question:   Release to patient     Answer:   Routine Release   • XR Ankle 3+ View Right     Order Specific Question:   Reason for Exam:     Answer:   entire ankle pain     room 13  wb   may leave after xray     Order Specific Question:   Does this patient have a diabetic monitoring/medication delivering device on?     Answer:   No     Order " Specific Question:   Release to patient     Answer:   Routine Release          Note is dictated utilizing voice recognition software. Unfortunately this leads to occasional typographical errors. I apologize in advance if the situation occurs. If questions occur please do not hesitate to call our office.    Transcribed from ambient dictation for RAMANDEEP Huffman DPM by Melissa Chan .  02/28/23   20:08 EST    Patient or patient representative verbalized consent to the visit recording.  I have personally performed the services described in this document as transcribed by the above individual, and it is both accurate and complete.

## 2023-04-19 ENCOUNTER — TELEPHONE (OUTPATIENT)
Dept: PODIATRY | Facility: CLINIC | Age: 63
End: 2023-04-19